# Patient Record
Sex: MALE | Race: OTHER
[De-identification: names, ages, dates, MRNs, and addresses within clinical notes are randomized per-mention and may not be internally consistent; named-entity substitution may affect disease eponyms.]

---

## 2022-10-15 ENCOUNTER — HOSPITAL ENCOUNTER (EMERGENCY)
Dept: HOSPITAL 54 - ER | Age: 37
LOS: 1 days | Discharge: HOME | End: 2022-10-16
Payer: COMMERCIAL

## 2022-10-15 VITALS — WEIGHT: 170 LBS | HEIGHT: 69 IN | BODY MASS INDEX: 25.18 KG/M2

## 2022-10-15 DIAGNOSIS — M25.562: Primary | ICD-10-CM

## 2022-10-15 DIAGNOSIS — Z60.2: ICD-10-CM

## 2022-10-15 DIAGNOSIS — Z79.1: ICD-10-CM

## 2022-10-15 SDOH — SOCIAL STABILITY - SOCIAL INSECURITY: PROBLEMS RELATED TO LIVING ALONE: Z60.2

## 2022-10-15 NOTE — NUR
BIBS C/O LEFT KNEE PAIN S/P "LIFTING PATIENT AT WORK". PT IS ALERT AND 
ORIENTED. RR EVEN AND NONLABORED. CONNECTED TO MONITOR. VSS

## 2022-10-16 VITALS — DIASTOLIC BLOOD PRESSURE: 85 MMHG | SYSTOLIC BLOOD PRESSURE: 124 MMHG
